# Patient Record
Sex: FEMALE | Race: BLACK OR AFRICAN AMERICAN | NOT HISPANIC OR LATINO | URBAN - NONMETROPOLITAN AREA
[De-identification: names, ages, dates, MRNs, and addresses within clinical notes are randomized per-mention and may not be internally consistent; named-entity substitution may affect disease eponyms.]

---

## 2017-04-10 PROBLEM — H16.223: Noted: 2017-04-10

## 2017-04-10 PROBLEM — H25.813: Noted: 2017-04-10

## 2017-04-10 PROBLEM — E11.9: Noted: 2017-04-10

## 2017-04-10 PROBLEM — H40.003: Noted: 2017-04-10

## 2020-07-09 ENCOUNTER — IMPORTED ENCOUNTER (OUTPATIENT)
Dept: URBAN - NONMETROPOLITAN AREA CLINIC 1 | Facility: CLINIC | Age: 80
End: 2020-07-09

## 2020-07-09 PROCEDURE — 92015 DETERMINE REFRACTIVE STATE: CPT

## 2020-07-09 PROCEDURE — 92014 COMPRE OPH EXAM EST PT 1/>: CPT

## 2020-07-09 NOTE — PATIENT DISCUSSION
DM s DR-Stressed the importance of keeping blood sugars under control blood pressure under control and weight normalization and regular visits with PCP. -Explained the possible effects of poorly controlled diabetes and the damage that diabetes can cause to ocular health. -Patient to check HgbA1C.-Pt instructed to contact our office with any vision changes. 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 Cataract OU-Pt has no visual complaints -Reviewed symptoms of advancing cataract growth such as glare and halos and decreased vision.-Continue to monitor for now. Pt will notify us if any new symptoms develop. RTC 1 yr CEE Letter to Arvind 07/09/20

## 2022-04-09 ASSESSMENT — VISUAL ACUITY
OD_SC: 20/30-2
OU_CC: 20/30-
OS_PH: 20/30
OS_SC: 20/40-

## 2022-04-09 ASSESSMENT — TONOMETRY
OD_IOP_MMHG: 20
OS_IOP_MMHG: 21

## 2022-04-09 ASSESSMENT — PACHYMETRY
OS_CT_UM: 519; ADJ: THIN
OD_CT_UM: 523; ADJ: THIN

## 2024-04-26 ENCOUNTER — NEW PATIENT (OUTPATIENT)
Dept: RURAL CLINIC 1 | Facility: CLINIC | Age: 84
End: 2024-04-26

## 2024-04-26 DIAGNOSIS — H40.013: ICD-10-CM

## 2024-04-26 DIAGNOSIS — E11.9: ICD-10-CM

## 2024-04-26 DIAGNOSIS — H16.223: ICD-10-CM

## 2024-04-26 DIAGNOSIS — H25.813: ICD-10-CM

## 2024-04-26 PROCEDURE — 99204 OFFICE O/P NEW MOD 45 MIN: CPT

## 2024-04-26 ASSESSMENT — TONOMETRY
OD_IOP_MMHG: 21
OS_IOP_MMHG: 21

## 2024-04-26 ASSESSMENT — VISUAL ACUITY
OS_CC: 20/40-2
OU_CC: 20/30
OD_CC: 20/40

## 2025-08-22 ENCOUNTER — COMPREHENSIVE EXAM (OUTPATIENT)
Age: 85
End: 2025-08-22

## 2025-08-22 DIAGNOSIS — H25.813: ICD-10-CM

## 2025-08-22 DIAGNOSIS — H16.223: ICD-10-CM

## 2025-08-22 DIAGNOSIS — E11.9: ICD-10-CM

## 2025-08-22 DIAGNOSIS — H40.013: ICD-10-CM

## 2025-08-22 PROCEDURE — 92083 EXTENDED VISUAL FIELD XM: CPT

## 2025-08-22 PROCEDURE — 92014 COMPRE OPH EXAM EST PT 1/>: CPT
